# Patient Record
Sex: FEMALE | Employment: FULL TIME | URBAN - METROPOLITAN AREA
[De-identification: names, ages, dates, MRNs, and addresses within clinical notes are randomized per-mention and may not be internally consistent; named-entity substitution may affect disease eponyms.]

---

## 2019-10-24 DIAGNOSIS — Z00.00 BLOOD TESTS FOR ROUTINE GENERAL PHYSICAL EXAMINATION: Primary | ICD-10-CM

## 2019-10-24 DIAGNOSIS — Z00.6 EXAMINATION OF PARTICIPANT IN CLINICAL TRIAL: ICD-10-CM

## 2019-11-01 ENCOUNTER — RESEARCH ENCOUNTER (OUTPATIENT)
Dept: RESEARCH | Facility: HOSPITAL | Age: 24
End: 2019-11-01

## 2019-11-01 NOTE — PROGRESS NOTES
Consent Date: 01Nov2019  Protocol Name: PET Normal  Protocol Number: PET Normal  Sponsor: [facility]   PI: SANDRA Malik MD  Screening Investigator: ASNDRA Malik MD  IRB Number: [id number]  Visit: Screening Visit  [facility] IRB Approval Date: 30Jul2019  Subjects Original Protocol Version: Approved 05Jun2018  Present For Discussion: Subject: Jacky Perez and : JESSICA Acosta.   Is LAR Consenting For Subject: No     Prior to the Informed Consent (IC) being signed, or any study protocol required data collection, testing, procedure, or intervention being performed, the following was done and/or discussed:  · Patient was given a copy of the IC for review   · Purpose of the study and qualifications to participate   · Study design, Follow up schedule, and tests or procedures done at each visit  · Confidentiality and HIPAA Authorization for Release of Medical Records for the research trial/ subject's rights/research related injury  · Risk, Benefits, Alternative Treatments, Compensation and Costs  · Participation in the research trial is voluntary and patient may withdraw at anytime  · Contact information for study related questions     Patient verbalizes understanding of the above: Yes  Contact information for CRC and PI given to patient: Yes  Patient able to adequately summarize: the purpose of the study, the risks associated with the study, and all procedures, testing, and follow-ups associated with the study: Yes     The Subject Jacky Perez presented to Screening Visit as previously scheduled. The Subject was escorted to a private exam room and provided with a blank copy of the Informed Consent Document. The Subject was instructed to read the ICD in its entirety and alert the : JESSICA Acosta when this process had been completed. After the Subject informed JESSICA Chan she was ready to continue with the Informed Consent Process, The Subject is queried to  determine if there were any questions that required clarification by SANDRA Malik M.D., who was available during the Informed Consent Process as needed. The ICD was review with Subject section by section to ensure the Subject understood the information contained within the ICD. After the Subject Jacky Perez confirmed she had made an informed decision to proceed with providing consent for participation, she signed the informed consent form for the PET Normal Research Study with an IRB approval date of [id number].  A copy of the completed Informed Consent Document was presented to the Subject to keep with her personal records. The original consent was scanned into electronic medical records (EPIC) and filed into the subject's research study binder.

## 2019-11-01 NOTE — PROGRESS NOTES
Visit Date: 2019  Protocol Name: PET Normal  Protocol Number: PET Normal  Sponsor: [facility]   PI: SANDRA Malik MD  Screening Investigator: SANDRA Malik MD  IRB Number: [id number]  Visit: Screening Visit  Subjects Original Protocol Version: Approved 2018     Subject presented to Screening Visits as previously scheduled. Prior to the start of any visit procedures the Subjects Informed Consent Process was completed. Present during the Informed Consent Discussion was: the Subject: Jacky Perez and : Debby Keating [facility]. Investigator SANDRA Malik M.D. was available as required for Subjects questions. The Subject was not accompanied by any friends or family for today's visits. The Subject denied any questions or concerns regarding the ICF. The Informed Consent Document has been uploaded into Epic, and the original Document is located in the Subjects Research Binder.     After the Subject consented to participate in the Study the Screening Visit is initiated. The following Screening Procedure are completed per protocol:      Subject Number Assigned a Subject Number: The Subject has been assigned Subject Number: [id number]     Inclusion and Exclusion: The Inclusion and Exclusion has been reviewed. The Subject has been identified as eligible for continuation in Screening by SANDRA Malik MD. Eligibility had been documented on the Inclusion and Exclusion Worksheet, which is available in the Subjects Research Binder.      Subjects Demographics:                           Age: 24 years             : [id number]      Sex: Female                            Ethnicity: Non- / Non-     Race:  Romana     Medical History: The Subjects Medical History has been reviewed with the Subject. This information has been recorded on the Medical History Log and is available in the Subjects Research Binder.      Concomitant Medications: The Subjects Concomitant Medications have been  reviewed with the Subject. The current medications have been recorded on the Concomitant Medications Log and are available in the Subjects Research Binder.      Menarche Status: During review of the Subjects Medical History the Subject confirmed she was a Menarche Female. The Subject confirmed her LMP was 30Oct2019. This information has been recorded on the Medical History Log and is available in the Subjects Research Binder.         Form of Contraception: The Subject verbally confirms that she is a Female of Child Bearing Potential. The Subject indicated her current form of contraception is Abstinence. The Subject voices understanding that the Subjects must continue to use Abstinence as her form of contraception during her participation in the Study and for at least 14 days following the administration of Rubidium-82, at her PET Visit on 09Nov2019.The Subject voices understanding to immediately report any suspected pregnancies or failed abstinence to the Study Staff.     Subjects Manual Blood Pressure: The Subjects Manual Blood Pressure was obtained after 5 minutes of rest in the sitting position on the Left Arm with a regular blood pressure cuff.       Time of Manual Blood Pressure: 08:57  Systolic Blood Pressure: 102  Diastolic Blood Pressure: 068     The Manual Blood Pressure was assessed by SANDRA Malik MD and determined to be Not Clinically Significant.       Laboratory Sample Collection: The following Subjects Screening Laboratory Samples were collected: Fasting Lipids, Basic Metabolic Panel, and Nicotine                 Date of Last Meal: 31Oct2019              Time of Last Meal: 20:30              Venipuncture Obtained Via: Right Antecubital                  Sample Collected at: 09:07     Eligibility for Participation: Upon review of the Inclusion Criteria, Exclusion Criteria, Subjects Medical History, Concomitant Medications Log, Subjects Manual Blood Pressure, the Investigator: SANDRA Malik MD  determined that the Subject could continue their participation in the Screening Process.       Adverse Event or Serious Adverse Event: During the Screening Visit there are no reported Adverse Events or Serious Adverse Events. The Subject voiced understanding to report to the Study Staff any: Adverse Events, Serious Adverse Events, changes in Subjects health, worsening of any Medical History, visits to ER/UC or Hospitalization.    Discharge From Screening Visit: After all Screening Visits Procedure have been completed the Subjects PET Visit is scheduled on 09Nov2019. The Subject voiced understanding that after the Laboratory Results are received and assess for Eligibility and Clinical Significance, they will be contact to confirm the PET Visit appointment time. The Subject voiced understanding that all Caffeine containing products must be withheld for 48 hours prior to PET Visit. The Subject voice understanding that a caffeine laboratory sample will be collected on the day of the PET Visit. The Subject agrees to report any New Medications, Discontinued Medications, or changes to Current Medication Doses, Frequencies or Quantities to the Study Staff. The Subject agrees to contact the site with any questions or concerns that arise prior to the PET Visit. Upon departure the Subject was in good condition.

## 2019-11-06 DIAGNOSIS — Z00.00 ROUTINE GENERAL MEDICAL EXAMINATION AT A HEALTH CARE FACILITY: Primary | ICD-10-CM

## 2019-11-06 DIAGNOSIS — Z00.6 EXAMINATION OF PARTICIPANT IN CLINICAL TRIAL: ICD-10-CM

## 2019-11-09 ENCOUNTER — CLINICAL SUPPORT (OUTPATIENT)
Dept: CARDIOLOGY | Facility: CLINIC | Age: 24
End: 2019-11-09

## 2019-11-09 VITALS — HEIGHT: 66 IN | WEIGHT: 135.56 LBS | BODY MASS INDEX: 21.79 KG/M2

## 2019-11-09 DIAGNOSIS — Z00.6 EXAMINATION OF PARTICIPANT IN CLINICAL TRIAL: ICD-10-CM

## 2019-11-09 DIAGNOSIS — Z00.00 ROUTINE GENERAL MEDICAL EXAMINATION AT A HEALTH CARE FACILITY: ICD-10-CM

## 2019-11-09 LAB
CFR FLOW - ANTERIOR: 2.59 CC/MIN/G
CFR FLOW - INFERIOR: 3.06 CC/MIN/G
CFR FLOW - LATERAL: 2.56 CC/MIN/G
CFR FLOW - MAX: 4.2 CC/MIN/G
CFR FLOW - MIN: 2 CC/MIN/G
CFR FLOW - SEPTAL: 2.9 CC/MIN/G
CFR FLOW - WHOLE HEART: 2.78
CV PHARM DOSE: 34.4 MG
CV STRESS BASE HR: 78 BPM
DIASTOLIC BLOOD PRESSURE: 82 MMHG
END DIASTOLIC INDEX-HIGH: 170 ML/M2
END SYSTOLIC INDEX-HIGH: 70 ML/M2
NUC REST DIASTOLIC VOLUME INDEX: 75
NUC REST EJECTION FRACTION: 75
NUC REST SYSTOLIC VOLUME INDEX: 19
NUC STRESS DIASTOLIC VOLUME INDEX: 46
NUC STRESS EJECTION FRACTION: 90 %
NUC STRESS SYSTOLIC VOLUME INDEX: 5
OHS CV CPX 1 MINUTE RECOVERY HEART RATE: 108 BPM
OHS CV CPX 85 PERCENT MAX PREDICTED HEART RATE MALE: 156
OHS CV CPX MAX PREDICTED HEART RATE: 184
OHS CV CPX PATIENT IS FEMALE: 1
OHS CV CPX PATIENT IS MALE: 0
OHS CV CPX PEAK DIASTOLIC BLOOD PRESSURE: 41 MMHG
OHS CV CPX PEAK HEAR RATE: 100 BPM
OHS CV CPX PEAK RATE PRESSURE PRODUCT: NORMAL
OHS CV CPX PEAK SYSTOLIC BLOOD PRESSURE: 106 MMHG
OHS CV CPX PERCENT MAX PREDICTED HEART RATE ACHIEVED: 54
OHS CV CPX RATE PRESSURE PRODUCT PRESENTING: 156
REST FLOW - ANTERIOR: 1.12 CC/MIN/G
REST FLOW - INFERIOR: 1.02 CC/MIN/G
REST FLOW - LATERAL: 1.16 CC/MIN/G
REST FLOW - MAX: 1.5 CC/MIN/G
REST FLOW - MIN: 0.7 CC/MIN/G
REST FLOW - SEPTAL: 0.91 CC/MIN/G
REST FLOW - WHOLE HEART: 1.05 CC/MIN/G
RETIRED EF AND QEF - SEE NOTES: 51 %
STRESS ECHO TARGET HR: 166 BPM
STRESS FLOW - ANTERIOR: 2.87 CC/MIN/G
STRESS FLOW - INFERIOR: 3.08 CC/MIN/G
STRESS FLOW - LATERAL: 2.9 CC/MIN/G
STRESS FLOW - MAX: 3.6 CC/MIN/G
STRESS FLOW - MIN: 2 CC/MIN/G
STRESS FLOW - SEPTAL: 2.6 CC/MIN/G
STRESS FLOW - WHOLE HEART: 2.86 CC/MIN/G
SYSTOLIC BLOOD PRESSURE: 2 MMHG

## 2019-11-09 PROCEDURE — 93016 CARDIAC PET SCAN STRESS (CUPID ONLY): ICD-10-PCS | Mod: S$PBB,,, | Performed by: INTERNAL MEDICINE

## 2019-11-09 PROCEDURE — 93016 CV STRESS TEST SUPVJ ONLY: CPT | Mod: S$PBB,,, | Performed by: INTERNAL MEDICINE

## 2019-11-09 PROCEDURE — 99999 PR PBB SHADOW E&M-EST. PATIENT-LVL I: CPT | Mod: PBBFAC,,,

## 2019-11-09 PROCEDURE — 78492 CARDIAC PET SCAN STRESS (CUPID ONLY): ICD-10-PCS | Mod: 26,S$PBB,, | Performed by: INTERNAL MEDICINE

## 2019-11-09 PROCEDURE — 99211 OFF/OP EST MAY X REQ PHY/QHP: CPT | Mod: PBBFAC,25

## 2019-11-09 PROCEDURE — 78492 MYOCRD IMG PET MLT RST&STRS: CPT | Mod: PBBFAC | Performed by: INTERNAL MEDICINE

## 2019-11-09 PROCEDURE — 93018 CV STRESS TEST I&R ONLY: CPT | Mod: S$PBB,,, | Performed by: INTERNAL MEDICINE

## 2019-11-09 PROCEDURE — 99999 PR PBB SHADOW E&M-EST. PATIENT-LVL I: ICD-10-PCS | Mod: PBBFAC,,,

## 2019-11-09 PROCEDURE — 93018 CARDIAC PET SCAN STRESS (CUPID ONLY): ICD-10-PCS | Mod: S$PBB,,, | Performed by: INTERNAL MEDICINE

## 2019-11-09 RX ORDER — DIPYRIDAMOLE 5 MG/ML
34.36 INJECTION INTRAVENOUS
Status: COMPLETED | OUTPATIENT
Start: 2019-11-09 | End: 2019-11-09

## 2019-11-09 RX ADMIN — DIPYRIDAMOLE 34.35 MG: 5 INJECTION INTRAVENOUS at 11:11

## 2019-11-11 ENCOUNTER — LAB VISIT (OUTPATIENT)
Dept: LAB | Facility: HOSPITAL | Age: 24
End: 2019-11-11
Attending: INTERNAL MEDICINE

## 2019-11-11 DIAGNOSIS — Z00.00 ROUTINE GENERAL MEDICAL EXAMINATION AT A HEALTH CARE FACILITY: ICD-10-CM

## 2019-11-11 DIAGNOSIS — Z00.6 EXAMINATION OF PARTICIPANT IN CLINICAL TRIAL: ICD-10-CM

## 2019-11-11 PROCEDURE — 80155 DRUG ASSAY CAFFEINE: CPT

## 2019-11-11 PROCEDURE — 36415 COLL VENOUS BLD VENIPUNCTURE: CPT

## 2019-11-19 LAB — CAFFEINE: <1 MCG/ML (ref 8–20)
